# Patient Record
(demographics unavailable — no encounter records)

---

## 2025-07-16 NOTE — HISTORY OF PRESENT ILLNESS
[___ Day(s)] : [unfilled] day(s) [de-identified] : febrile  [FreeTextEntry6] : stomach hurts  febrile- today headache

## 2025-07-16 NOTE — REVIEW OF SYSTEMS
[Abdominal Pain] : abdominal pain [Negative] : Genitourinary [Vomiting] : no vomiting [Diarrhea] : no diarrhea

## 2025-07-16 NOTE — PHYSICAL EXAM
[Erythematous Oropharynx] : erythematous oropharynx [Soft] : soft [NL] : warm, clear [Enlarged Tonsils] : tonsils not enlarged [Exudate] : no exudate [Ulcerative Lesions] : no ulcerative lesions [Cobblestoning] : no cobblestoning of posterior pharynx [Tender] : nontender [Distended] : nondistended [de-identified] : Throat slightly red.  [FreeTextEntry9] : Jumping with no pain.

## 2025-07-16 NOTE — DISCUSSION/SUMMARY
[FreeTextEntry1] : Febrile (x1) . N/L Exam Abdomen soft/no hsm. Throat slightly red, Chest/lungs clear. If abdominal pain persists and radiates down to rlq go to ed and also if fever persists til saturday rtc